# Patient Record
Sex: MALE | Race: BLACK OR AFRICAN AMERICAN | NOT HISPANIC OR LATINO | Employment: OTHER | URBAN - METROPOLITAN AREA
[De-identification: names, ages, dates, MRNs, and addresses within clinical notes are randomized per-mention and may not be internally consistent; named-entity substitution may affect disease eponyms.]

---

## 2020-12-07 PROBLEM — M19.012 ARTHRITIS OF LEFT ACROMIOCLAVICULAR JOINT: Status: ACTIVE | Noted: 2020-12-07

## 2020-12-07 PROBLEM — M75.42 IMPINGEMENT SYNDROME OF LEFT SHOULDER: Status: ACTIVE | Noted: 2020-12-07

## 2020-12-07 PROBLEM — M75.41 IMPINGEMENT SYNDROME OF RIGHT SHOULDER: Status: ACTIVE | Noted: 2020-12-07

## 2020-12-07 PROBLEM — M25.511 PAIN OF RIGHT SHOULDER JOINT ON MOVEMENT: Status: ACTIVE | Noted: 2020-12-07

## 2020-12-07 PROBLEM — M25.512 PAIN OF LEFT SHOULDER JOINT ON MOVEMENT: Status: ACTIVE | Noted: 2020-12-07

## 2021-12-08 PROBLEM — Z85.46 HISTORY OF PROSTATE CANCER: Status: ACTIVE | Noted: 2021-12-08

## 2021-12-10 PROBLEM — Z98.890 STATUS POST SURGERY: Status: ACTIVE | Noted: 2021-12-10

## 2021-12-10 PROBLEM — F10.21 RECOVERING ALCOHOLIC: Status: ACTIVE | Noted: 2021-12-10

## 2021-12-10 PROBLEM — M19.012 ARTHROSIS OF LEFT ACROMIOCLAVICULAR JOINT: Status: ACTIVE | Noted: 2021-12-10

## 2021-12-10 PROBLEM — F19.11 HISTORY OF DRUG ABUSE IN REMISSION: Status: ACTIVE | Noted: 2021-12-10

## 2022-01-19 PROBLEM — S83.241A TEAR OF MEDIAL MENISCUS OF RIGHT KNEE, CURRENT: Status: ACTIVE | Noted: 2022-01-19

## 2022-01-19 PROBLEM — M23.51 OLD COMPLETE ACL TEAR, RIGHT: Status: ACTIVE | Noted: 2022-01-19

## 2022-03-04 PROBLEM — M17.11 PRIMARY LOCALIZED OSTEOARTHRITIS OF RIGHT KNEE: Status: ACTIVE | Noted: 2022-03-04

## 2022-03-04 PROBLEM — M75.42 IMPINGEMENT SYNDROME OF LEFT SHOULDER: Status: RESOLVED | Noted: 2020-12-07 | Resolved: 2022-03-04

## 2022-03-04 PROBLEM — Z98.890 STATUS POST SURGERY: Status: ACTIVE | Noted: 2022-03-04

## 2022-03-04 PROBLEM — M19.012 ARTHROSIS OF LEFT ACROMIOCLAVICULAR JOINT: Status: RESOLVED | Noted: 2021-12-10 | Resolved: 2022-03-04

## 2022-03-04 PROBLEM — M23.51 OLD COMPLETE ACL TEAR, RIGHT: Chronic | Status: ACTIVE | Noted: 2022-01-19

## 2022-03-04 PROBLEM — Z98.890 STATUS POST SURGERY: Status: RESOLVED | Noted: 2021-12-10 | Resolved: 2022-03-04

## 2022-10-02 PROBLEM — R68.89 ABNORMAL ANKLE BRACHIAL INDEX (ABI): Status: ACTIVE | Noted: 2022-10-02

## 2022-10-02 PROBLEM — I73.9 CLAUDICATION IN PERIPHERAL VASCULAR DISEASE: Status: ACTIVE | Noted: 2022-10-02

## 2022-10-02 PROBLEM — I73.9 PAD (PERIPHERAL ARTERY DISEASE): Status: ACTIVE | Noted: 2022-10-02

## 2022-10-02 PROBLEM — I70.219 ATHEROSCLEROSIS OF NATIVE ARTERY OF EXTREMITY WITH INTERMITTENT CLAUDICATION: Status: ACTIVE | Noted: 2022-10-02

## 2022-11-02 ENCOUNTER — PATIENT MESSAGE (OUTPATIENT)
Dept: RESEARCH | Facility: HOSPITAL | Age: 62
End: 2022-11-02

## 2022-11-11 ENCOUNTER — RESEARCH ENCOUNTER (OUTPATIENT)
Dept: RESEARCH | Facility: HOSPITAL | Age: 62
End: 2022-11-11

## 2022-11-11 NOTE — PROGRESS NOTES
Study Title: Transcending COVID-19 barriers to pain care in [place]: Pragmatic comparative effectiveness trial of evidence-based, on-demand, digital behavioral treatments for chronic pain.  Sponsor:  [facility]   Study: NIH Digital Pain Treatment Study - Transcending COVID-19 barriers to pain care in [place]: Pragmatic comparative effectiveness trial of evidence-based, on-demand, digital behavioral treatments for chronic pain.   IRB/Protocol #: 2021.177 - Phase 2?  Study#([facility]):  [id number]  Principle Investigator - Iftikhar Salamanca   Sub-Investigator - Scott Andrade   Subject Yousif's Study Number: [id number]  Subject [facility]'s Study Number:536   Sponsor:  [facility]      Date: 11/11/2022       Informed Consent    Subject was consented via phone to be evaluated for the NIH Digital Pain Treatment Research Study eligibility. Consent was completed using the most current IRB approved version of the ICF dated 11-AUG-2022 by myself and patient was given ample time to review consent prior to execution of the informed consent.         Prior to the Informed Consent (IC) being signed, or any study protocol required data collection, testing, procedure, or intervention being performed, the following was done and/or discussed:    Purpose of the study and qualifications to participate;   Study design, follow up schedule;  Risk, Benefits, Alternative Treatments, Compensation and Costs;  Participation in the research trial is voluntary and patient may withdraw at anytime;  Contact information for study related questions.    Patient verbalized understanding of the above: yes  Contact information for CRC and PI given to patient: yes    Pt verbally agreed to being a participant in the NIH Digital Pain Treatment research study with an IRB approved consent being read to the participant.  Patient consent was reviewed with patient and all questions answered satisfactorily.      Patient answered questions related to their pain and  lifestyle questions.Subject meets all inclusion criteria and no exclusion criteria.    Subject was provide with staff contact information should they have any questions and concerns.

## 2023-04-25 ENCOUNTER — PATIENT OUTREACH (OUTPATIENT)
Dept: ADMINISTRATIVE | Facility: HOSPITAL | Age: 63
End: 2023-04-25

## 2023-04-25 DIAGNOSIS — Z12.11 SCREENING FOR COLORECTAL CANCER: Primary | ICD-10-CM

## 2023-04-25 DIAGNOSIS — Z12.12 SCREENING FOR COLORECTAL CANCER: Primary | ICD-10-CM

## 2023-06-19 LAB — NONINV COLON CA DNA+OCC BLD SCRN STL QL: NEGATIVE

## 2023-06-22 ENCOUNTER — PATIENT OUTREACH (OUTPATIENT)
Dept: ADMINISTRATIVE | Facility: OTHER | Age: 63
End: 2023-06-22

## 2023-06-22 NOTE — PROGRESS NOTES
CHW - Initial Contact    This Community Health Worker completed the Social Determinant of Health questionnaire with patient during clinic visit today.    Pt identified barriers of most importance are: patient identified no barriers of importance during clinic visit.   Referrals to community agencies completed with patient consent outside of [facility] include: No community referral needed during clinic visit   Referrals were put through [facility] - no:   Support and Services: No support services needed during clinic visit   Other information discussed the patient needs  help with: patient discussed no other information during clinic visit    Follow up required: Yes  No future outreach task assigned

## 2023-07-11 ENCOUNTER — PATIENT OUTREACH (OUTPATIENT)
Dept: ADMINISTRATIVE | Facility: OTHER | Age: 63
End: 2023-07-11

## 2023-07-17 ENCOUNTER — PATIENT OUTREACH (OUTPATIENT)
Dept: ADMINISTRATIVE | Facility: OTHER | Age: 63
End: 2023-07-17

## 2023-07-17 NOTE — PROGRESS NOTES
CHW - Outreach Attempt    Community Health Worker left a voicemail message for 2nd attempt to contact patient regarding: initial screening SDOH follow up phone interview.  Community Health Worker to attempt to contact patient on: 07/17/2023.

## 2023-07-25 ENCOUNTER — PATIENT OUTREACH (OUTPATIENT)
Dept: ADMINISTRATIVE | Facility: OTHER | Age: 63
End: 2023-07-25

## 2023-07-25 NOTE — PROGRESS NOTES
CHW - Follow Up    This Community Health Worker completed a follow up visit with patient via telephone today.  Pt reported: patient reported he is good and no assistance is needed during phone interview.   Will reach out if assistance is needed in the future.   Community Health Worker provided: CHW spoke with patient he is doing good.  Follow up required: No  No future outreach task assigned       CHW - Case Closure    This Community Health Worker spoke to patient via telephone today.   Pt reported: patient reported he is good and no assistance is needed during phone interview.   Will reach out if assistance is needed in the future.   Pt denied any additional needs at this time and agrees with episode closure at this time.  Provided patient with Community Health Worker's contact information and encouraged him to contact this Community Health Worker if additional needs arise.

## 2024-10-23 PROBLEM — R97.20 PSA ELEVATION: Status: ACTIVE | Noted: 2024-10-23

## 2024-10-23 PROBLEM — N39.41 URGE INCONTINENCE: Status: ACTIVE | Noted: 2024-10-23

## 2024-11-19 ENCOUNTER — PATIENT MESSAGE (OUTPATIENT)
Dept: ADMINISTRATIVE | Facility: HOSPITAL | Age: 64
End: 2024-11-19

## 2025-01-13 PROBLEM — C61 METASTATIC CASTRATION-SENSITIVE ADENOCARCINOMA OF PROSTATE: Status: ACTIVE | Noted: 2025-01-13

## 2025-01-13 PROBLEM — Z19.1 METASTATIC CASTRATION-SENSITIVE ADENOCARCINOMA OF PROSTATE: Status: ACTIVE | Noted: 2025-01-13

## 2025-02-20 PROBLEM — E55.9 VITAMIN D DEFICIENCY: Status: ACTIVE | Noted: 2025-02-20

## 2025-02-20 PROBLEM — E66.01 SEVERE OBESITY (BMI 35.0-39.9) WITH COMORBIDITY: Status: ACTIVE | Noted: 2025-02-20

## 2025-02-20 PROBLEM — E11.9 TYPE 2 DIABETES MELLITUS WITHOUT COMPLICATION, WITHOUT LONG-TERM CURRENT USE OF INSULIN: Status: ACTIVE | Noted: 2025-02-20

## 2025-06-23 ENCOUNTER — PATIENT MESSAGE (OUTPATIENT)
Dept: ADMINISTRATIVE | Facility: OTHER | Age: 65
End: 2025-06-23
Payer: MEDICARE